# Patient Record
Sex: FEMALE | Race: WHITE | Employment: FULL TIME | URBAN - METROPOLITAN AREA
[De-identification: names, ages, dates, MRNs, and addresses within clinical notes are randomized per-mention and may not be internally consistent; named-entity substitution may affect disease eponyms.]

---

## 2017-03-14 ENCOUNTER — OFFICE VISIT (OUTPATIENT)
Dept: DERMATOLOGY | Age: 42
End: 2017-03-14

## 2017-03-14 DIAGNOSIS — L30.9 DERMATITIS: ICD-10-CM

## 2017-03-14 DIAGNOSIS — L70.0 ACNE VULGARIS: ICD-10-CM

## 2017-03-14 DIAGNOSIS — D22.9 MULTIPLE NEVI: Primary | ICD-10-CM

## 2017-03-14 PROCEDURE — 99203 OFFICE O/P NEW LOW 30 MIN: CPT | Performed by: DERMATOLOGY

## 2017-03-14 RX ORDER — CLINDAMYCIN AND BENZOYL PEROXIDE 10; 50 MG/G; MG/G
GEL TOPICAL
Qty: 50 G | Refills: 1 | Status: SHIPPED | OUTPATIENT
Start: 2017-03-14

## 2017-06-19 ENCOUNTER — OFFICE VISIT (OUTPATIENT)
Dept: DERMATOLOGY | Age: 42
End: 2017-06-19

## 2017-06-19 VITALS — WEIGHT: 153.8 LBS

## 2017-06-19 DIAGNOSIS — L70.0 ACNE VULGARIS: Primary | ICD-10-CM

## 2017-06-19 PROCEDURE — 99213 OFFICE O/P EST LOW 20 MIN: CPT | Performed by: DERMATOLOGY

## 2017-06-19 RX ORDER — MINOCYCLINE HYDROCHLORIDE 100 MG/1
CAPSULE ORAL
Qty: 60 CAPSULE | Refills: 3 | Status: SHIPPED | OUTPATIENT
Start: 2017-06-19 | End: 2022-04-14

## 2017-07-15 ENCOUNTER — HOSPITAL ENCOUNTER (OUTPATIENT)
Dept: MAMMOGRAPHY | Age: 42
Discharge: HOME | End: 2017-07-15
Attending: OBSTETRICS & GYNECOLOGY | Admitting: OBSTETRICS & GYNECOLOGY

## 2017-07-15 DIAGNOSIS — Z12.31 VISIT FOR SCREENING MAMMOGRAM: ICD-10-CM

## 2018-10-12 ENCOUNTER — HOSPITAL ENCOUNTER (OUTPATIENT)
Dept: MAMMOGRAPHY | Age: 43
Discharge: HOME | End: 2018-10-12
Payer: COMMERCIAL

## 2018-10-12 DIAGNOSIS — Z12.31 VISIT FOR SCREENING MAMMOGRAM: ICD-10-CM

## 2018-10-12 PROCEDURE — 77063 BREAST TOMOSYNTHESIS BI: CPT

## 2018-12-12 ENCOUNTER — TELEPHONE (OUTPATIENT)
Dept: DERMATOLOGY | Age: 43
End: 2018-12-12

## 2019-01-07 ENCOUNTER — TELEPHONE (OUTPATIENT)
Dept: DERMATOLOGY | Age: 44
End: 2019-01-07

## 2019-04-02 ENCOUNTER — OFFICE VISIT (OUTPATIENT)
Dept: DERMATOLOGY | Age: 44
End: 2019-04-02
Payer: COMMERCIAL

## 2019-04-02 DIAGNOSIS — L70.0 ACNE VULGARIS: ICD-10-CM

## 2019-04-02 DIAGNOSIS — D22.9 MULTIPLE NEVI: Primary | ICD-10-CM

## 2019-04-02 DIAGNOSIS — D48.5 NEOPLASM OF UNCERTAIN BEHAVIOR OF SKIN: ICD-10-CM

## 2019-04-02 PROCEDURE — 11102 TANGNTL BX SKIN SINGLE LES: CPT | Performed by: DERMATOLOGY

## 2019-04-02 PROCEDURE — 11103 TANGNTL BX SKIN EA SEP/ADDL: CPT | Performed by: DERMATOLOGY

## 2019-04-02 PROCEDURE — 99214 OFFICE O/P EST MOD 30 MIN: CPT | Performed by: DERMATOLOGY

## 2019-04-02 NOTE — PROGRESS NOTES
[facility]  Hubert David MD  [address]  1975    44 y.o. female     Date of Visit: 4/2/2019    Last seen: 6-2017    Chief Complaint: f/u acne, moles/lesions  Chief Complaint   Patient presents with    Skin Exam     Spot on bikini line, L calf      History of Present Illness:    1. Here for evaluation of multiple asx pigmented lesions on the trunk and extremities, present for many years; no change in size/shape/color of any lesions; no bleeding lesions. S/p bx by PCP of lesion on the L hip in 1-2019 - benign nevus ([facility] 2. She has a few concerning lesions on the R abdomen and L shin. Asx. 3. Here for f/u for facial breakouts - had been worsening a few years ago - cleared with tretinoin, benzaclin and tomer and stopped trx. Clear until flaring over the past month. No irritation or dryness. Daughter got  in September 2017. No personal or family hx of skin cancer. She had a lot of sun/tanning when younger but wears sunscreen regularly now. She and her  have a [address]. Review of Systems:  Gen: Feels well, good sense of health. Skin: no irritation or dryness. Past Medical History, Family History, Surgical History, Medications and Allergies reviewed. Outpatient Medications Marked as Taking for the 4/2/19 encounter (Office Visit) with Qiana Dunn MD   Medication Sig Dispense Refill    minocycline (MINOCIN;DYNACIN) 100 MG capsule One po bid with food. 60 capsule 3    Pseudoephedrine-Naproxen Na (ALEVE-D SINUS & COLD PO) Take by mouth      clindamycin-benzoyl peroxide (BENZACLIN) 1-5 % gel Apply topically 1-2 times daily for acne; can spot treat. 50 g 1    tretinoin (RETIN-A) 0.025 % cream Apply pea sized amount to face QOD, then increase to every night as tolerated.  20 g 1     No Known Allergies    Past Medical History:   Diagnosis Date    Allergic rhinitis     Sinus infection      History reviewed. No pertinent surgical history. Physical Examination     Gen, well-appearing  The following were examined and determined to be normal: Psych/Neuro, Scalp/hair, Conjunctivae/eyelids, Gums/teeth/lips, Neck, Breast/axilla/chest, Back, RUE, LUE, RLE, Nails/digits and buttocks. The following were examined and determined to be abnormal: Head/face, Abdomen and LLE.     trunk and extremities with scattered brown macules and papules   Face with few scattered tiny erythematous papules and few closed comedones  R lower abdomen with dark brown macule  L shin with brown-black and peripherally subtlly pink macule    Assessment and Plan     1. Benign-appearing nevi  - educ re ABCD's of MM   educ sun protection   encouraged skin check yearly (sooner if indicated), self checks    2. R/o dysplastic nevus - R lower abdomen and L shin  - 2 Shave biopsy performed after verbal consent obtained. Patient educated regarding risk of bleeding, infection, scar and educated on wound care. Skin cleansed with alcohol pad and site anesthetized with lido + epi. Aluminum chloride applied to site for hemostasis. Petrolatum ointment and bandage applied. Specimen bottle labeled with patient information and site and specimen sent to dermpath. 3. Acne vulgaris, mild mixed but no improvement with topicals  - resume retin a qhs 0.025 cream - ed irritation and photosensitivity  - onexton spot trx daily prn flares; ed bleaching and irritation    F/u 3-4 mos for acne, 1 year for skin check.

## 2019-04-05 LAB — DERMATOLOGY PATHOLOGY REPORT: NORMAL

## 2020-01-06 ENCOUNTER — OFFICE VISIT (OUTPATIENT)
Dept: DERMATOLOGY | Age: 45
End: 2020-01-06
Payer: COMMERCIAL

## 2020-01-06 PROCEDURE — 99214 OFFICE O/P EST MOD 30 MIN: CPT | Performed by: DERMATOLOGY

## 2020-01-06 RX ORDER — NAPROXEN 500 MG/1
550 TABLET ORAL 2 TIMES DAILY WITH MEALS
COMMUNITY

## 2020-01-06 NOTE — PROGRESS NOTES
[facility]  Abbey Melgar MD  [address]  1975    38 y.o. female     Date of Visit: 1/6/2020    Last seen: 4-2019    Chief Complaint: f/u acne, moles/lesions  Chief Complaint   Patient presents with    Skin Lesion     FSE   - mole check       HX: multi nevi    Acne     small patch -left nose     History of Present Illness:  Her daughter is due with a baby girl in May 2020.    1. Here for evaluation of multiple asx pigmented lesions on the trunk and extremities, present for many years; no change in size/shape/color of any lesions; no bleeding lesions. S/p bx by PCP of lesion on the L hip in 1-2019 - benign nevus (path in system - [facility]). 2. Hx of Mildly dysplastic nevus - R lower abdomen - bx'd 4-2019. No probs with this site. Benign nevus on the L shin also bx'd 4-2019. 3. Here for f/u for facial breakouts - had been worsening a few years ago - cleared with tretinoin, benzaclin and tomer and stopped trx. Using tretinioin intermittently. No irritation. 4. C/o discoloration on the R great toenail x many mos. Seems to grow out a bit and but never clears. No trx tried. Asx. Daughter got  in September 2017. No personal or family hx of skin cancer. She had a lot of sun/tanning when younger but wears sunscreen regularly now. She and her  have a [address]. Review of Systems:  Gen: Feels well, good sense of health. Skin: no irritation or dryness. No changing moles/lesions. Past Medical History, Family History, Surgical History, Medications and Allergies reviewed.     Outpatient Medications Marked as Taking for the 1/6/20 encounter (Office Visit) with Errol Gerber MD   Medication Sig Dispense Refill    naproxen (NAPROSYN) 500 MG tablet Take 550 mg by mouth 2 times daily (with meals)       Loratadine-Pseudoephedrine (CLARITIN-D 12 HOUR PO) Take by mouth     

## 2020-09-17 ENCOUNTER — HOSPITAL ENCOUNTER (OUTPATIENT)
Dept: WOMENS IMAGING | Age: 45
Discharge: HOME | End: 2020-09-17
Payer: COMMERCIAL

## 2020-09-17 PROCEDURE — 77063 BREAST TOMOSYNTHESIS BI: CPT

## 2021-01-11 ENCOUNTER — OFFICE VISIT (OUTPATIENT)
Dept: DERMATOLOGY | Age: 46
End: 2021-01-11
Payer: COMMERCIAL

## 2021-01-11 VITALS — TEMPERATURE: 97 F

## 2021-01-11 DIAGNOSIS — B35.1 ONYCHOMYCOSIS: ICD-10-CM

## 2021-01-11 DIAGNOSIS — Z86.018 HISTORY OF DYSPLASTIC NEVUS: ICD-10-CM

## 2021-01-11 DIAGNOSIS — D22.9 MULTIPLE NEVI: Primary | ICD-10-CM

## 2021-01-11 DIAGNOSIS — L70.0 ACNE VULGARIS: ICD-10-CM

## 2021-01-11 PROCEDURE — 99214 OFFICE O/P EST MOD 30 MIN: CPT | Performed by: DERMATOLOGY

## 2021-01-11 NOTE — PATIENT INSTRUCTIONS
Protecting Yourself From the Sun    · Apply an over-the-counter broad spectrum water resistant sunscreen with an SPF of at least 30 to exposed areas of the skin. Dont forget the ears and lips! Remember to reapply sunscreen about every 2 hours and after swimming or sweating. · Wear sun protective clothing. Swim shirts (aka. rash guards) are a great idea and negates the need to reapply sunscreen in those areas.      · Seek the shade whenever possible especially between the hours of 10 am and 4 pm when the suns rays are the strongest.     · Avoid tanning beds  ·

## 2021-01-11 NOTE — PROGRESS NOTES
[facility]  Slick Luu MD  [address]  1975    41 y.o. female     Date of Visit: 1/11/2021    Last seen: 1-2020    Chief Complaint: f/u acne, moles/lesions  Chief Complaint   Patient presents with    Skin Exam     History of Present Illness:  Her daughter had a baby girl in May 2020.    1. Here for evaluation of multiple asx pigmented lesions on the trunk and extremities, present for many years; no change in size/shape/color of any lesions; no bleeding lesions. S/p bx by PCP of lesion on the L hip in 1-2019 - benign nevus (path in system - [facility]). 2. Hx of Mildly dysplastic nevus - R lower abdomen - bx'd 4-2019. No probs with this site. Benign nevus on the L shin also bx'd 4-2019. 3. Here for f/u for facial breakouts - had been worsening a few years ago - cleared with tretinoin, benzaclin and tomer and stopped all trx. Has intermittent mild flares in recent years - using tretinioin with fairly good control. No irritation. 4. F/u for discoloration on the R great toenail x few years. Seems to grow out slowly and then worsens again.  + hx o trauma to nail regularly. Asx. Tried penlac after last visit - helped some. Daughter got  in September 2017. No personal or family hx of skin cancer. She had a lot of sun/tanning when younger but wears sunscreen regularly now. She and her  have a [address]. Review of Systems:  Gen: Feels well, good sense of health. Skin: no irritation or dryness. No changing moles/lesions. Past Medical History, Family History, Surgical History, Medications and Allergies reviewed. Outpatient Medications Marked as Taking for the 1/11/21 encounter (Office Visit) with Meghan Ortega MD   Medication Sig Dispense Refill    tretinoin (RETIN-A) 0.025 % cream Apply pea sized amount to face QOD, then increase to every night as tolerated.  20 g 3    minocycline (MINOCIN;DYNACIN) 100 MG capsule One po bid with food. 60 capsule 3    clindamycin-benzoyl peroxide (BENZACLIN) 1-5 % gel Apply topically 1-2 times daily for acne; can spot treat. 50 g 1     No Known Allergies    Past Medical History:   Diagnosis Date    Allergic rhinitis     Sinus infection      History reviewed. No pertinent surgical history. Physical Examination     Gen, well-appearing  The following were examined and determined to be normal: Psych/Neuro, Scalp/hair, Conjunctivae/eyelids, Gums/teeth/lips, Neck, Breast/axilla/chest, Back, RUE, LUE, RLE, Nails/digits and buttocks. The following were examined and determined to be abnormal: Head/face, Abdomen and LLE.     trunk and extremities with scattered brown macules and papules   Face with few tiny erythematous papules and few closed comedones  R lower abdomen with scar - clear  R great toenail previously with onycholysis and brown-yellow discoloration of the distal 1/2 of the plate; now with opaque red polish on - difficult to eval    Assessment and Plan     1. Benign-appearing nevi  2. Mildly dysplastic nevus - R lower abdomen - bx'd 4-2019  - educ re ABCD's of MM   educ sun protection   encouraged skin check yearly (sooner if indicated), self checks    3. Acne vulgaris, mild mixed  - cont retin a qhs 0.025 cream - ed irritation and photosensitivity    4. Onychomycosis - R great toenail - reportedly improved but not visible today (polish)  - penla qhs  - ed si/sx of skin cancer and f/u prn worsening    F/u 1 year for skin check.

## 2022-04-14 ENCOUNTER — OFFICE VISIT (OUTPATIENT)
Dept: DERMATOLOGY | Age: 47
End: 2022-04-14
Payer: COMMERCIAL

## 2022-04-14 VITALS — TEMPERATURE: 98 F

## 2022-04-14 DIAGNOSIS — L81.4 LENTIGINES: ICD-10-CM

## 2022-04-14 DIAGNOSIS — L70.0 ACNE VULGARIS: ICD-10-CM

## 2022-04-14 DIAGNOSIS — Z86.018 HISTORY OF DYSPLASTIC NEVUS: ICD-10-CM

## 2022-04-14 DIAGNOSIS — D22.9 MULTIPLE NEVI: Primary | ICD-10-CM

## 2022-04-14 PROCEDURE — 99213 OFFICE O/P EST LOW 20 MIN: CPT | Performed by: DERMATOLOGY

## 2022-04-14 RX ORDER — AZELASTINE 1 MG/ML
SPRAY, METERED NASAL
COMMUNITY
Start: 2022-04-05

## 2022-04-14 NOTE — PROGRESS NOTES
[facility]  Boni Lewis MD  [address]  1975    40 y.o. female     Date of Visit: 4/14/2022    Last seen: 1-2021    Chief Complaint: f/u acne, moles/lesions  Chief Complaint   Patient presents with    Skin Exam     FSE        HX: multi nevi     History of Present Illness:  Her daughter had a baby girl in May 2020 and new baby girl due May 2022. Bought multigen house with her daughter's family this year. 1. Here for evaluation of multiple asx pigmented lesions on the trunk and extremities, present for many years; no change in size/shape/color of any lesions; no bleeding lesions. S/p bx by PCP of lesion on the L hip in 1-2019 - benign nevus (path in system - [facility]). 2. Hx of Mildly dysplastic nevus - R lower abdomen - bx'd 4-2019. No probs with this site. Benign nevus on the L shin also bx'd 4-2019. 3. Here for f/u for facial breakouts - had been worsening a few years ago - cleared with tretinoin, benzaclin and tomer and stopped all trx except tretinoin. Overall good control. No irritation. Hx discoloration on the R great toenail x few years. Seems to grow out slowly and then worsens again.  + hx o trauma to nail regularly. Asx. Tried penlac previously - helped some. Daughter got  in September 2017. No personal or family hx of skin cancer. She had a lot of sun/tanning when younger but wears sunscreen regularly now. She and her  have a [address]. Review of Systems:  Gen: Feels well, good sense of health. Skin: no irritation or dryness. No changing moles/lesions. Past Medical History, Family History, Surgical History, Medications and Allergies reviewed.     Outpatient Medications Marked as Taking for the 4/14/22 encounter (Office Visit) with Madalyn Kennedy MD   Medication Sig Dispense Refill    azelastine (ASTELIN) 0.1 % nasal spray SPRAY ONE SPRAY IN EACH NOSTRIL TWICE DAILY AS NEEDED      tretinoin (RETIN-A) 0.025 % cream Apply pea sized amount to face QOD, then increase to every night as tolerated. 20 g 5    naproxen (NAPROSYN) 500 MG tablet Take 550 mg by mouth 2 times daily (with meals)       Loratadine-Pseudoephedrine (CLARITIN-D 12 HOUR PO) Take by mouth      clindamycin-benzoyl peroxide (BENZACLIN) 1-5 % gel Apply topically 1-2 times daily for acne; can spot treat. 50 g 1     No Known Allergies    Past Medical History:   Diagnosis Date    Allergic rhinitis     Sinus infection      No past surgical history on file. Physical Examination     Gen, well-appearing  FSE today    trunk and extremities with scattered brown macules and papules   Face with few tiny erythematous papules and few closed comedones  R lower abdomen with scar - clear  R great toenail previously with onycholysis and brown-yellow discoloration of the distal 1/2 of the plate; now with opaque red polish on - difficult to eval    Assessment and Plan     1. Benign-appearing nevi and lentigines  2. Mildly dysplastic nevus - R lower abdomen - bx'd 4-2019  - educ re ABCD's of MM   sun protection SPF 30+  encouraged skin check yearly (sooner if indicated), self checks    3. Acne vulgaris, stable  - cont retin a qhs 0.025 cream - ed irritation and photosensitivity    Not addressed today: Onychomycosis - R great toenail - reportedly improved but not visible today (polish)  - penla qhs    F/u 1 year for skin check.